# Patient Record
Sex: FEMALE | Race: WHITE | NOT HISPANIC OR LATINO | URBAN - METROPOLITAN AREA
[De-identification: names, ages, dates, MRNs, and addresses within clinical notes are randomized per-mention and may not be internally consistent; named-entity substitution may affect disease eponyms.]

---

## 2017-05-29 ENCOUNTER — OFFICE VISIT (OUTPATIENT)
Dept: UROLOGY | Facility: CLINIC | Age: 57
End: 2017-05-29
Payer: COMMERCIAL

## 2017-05-29 VITALS
BODY MASS INDEX: 27.48 KG/M2 | DIASTOLIC BLOOD PRESSURE: 78 MMHG | HEART RATE: 82 BPM | HEIGHT: 64 IN | SYSTOLIC BLOOD PRESSURE: 130 MMHG | WEIGHT: 160.94 LBS

## 2017-05-29 DIAGNOSIS — N81.6 RECTOCELE: ICD-10-CM

## 2017-05-29 DIAGNOSIS — N20.0 KIDNEY STONE: Primary | ICD-10-CM

## 2017-05-29 LAB
BILIRUB SERPL-MCNC: ABNORMAL MG/DL
BLOOD URINE, POC: ABNORMAL
COLOR, POC UA: YELLOW
GLUCOSE UR QL STRIP: ABNORMAL
KETONES UR QL STRIP: ABNORMAL
LEUKOCYTE ESTERASE URINE, POC: ABNORMAL
NITRITE, POC UA: ABNORMAL
PH, POC UA: 5
PROTEIN, POC: ABNORMAL
SPECIFIC GRAVITY, POC UA: 1.03
UROBILINOGEN, POC UA: ABNORMAL

## 2017-05-29 PROCEDURE — 99203 OFFICE O/P NEW LOW 30 MIN: CPT | Mod: 25,S$GLB,, | Performed by: UROLOGY

## 2017-05-29 PROCEDURE — 81002 URINALYSIS NONAUTO W/O SCOPE: CPT | Mod: S$GLB,,, | Performed by: UROLOGY

## 2017-05-29 PROCEDURE — 99999 PR PBB SHADOW E&M-EST. PATIENT-LVL III: CPT | Mod: PBBFAC,,, | Performed by: UROLOGY

## 2017-05-29 NOTE — PROGRESS NOTES
Subjective:       Patient ID: Sagrario Carballo is a 57 y.o. female.    Chief Complaint: Nephrolithiasis    HPI     57 year old with a history of left flank pain.  She has a history of an obstructing left ureteral stone and underwent ureteroscopy last year by Dr. Buck.  She complains of return of left flank pain for the last few weeks.  She says the pain is constant and is worse when she is sitting for a long time at her computer working.  A repeat CT scan is done and I also reviewed her scan from last year. There is a small 2-3 mm non-obstructing stone in the left lower pole which was present last year.  The larger stone in the renal pelvis is gone.  She also notices a bulging sensation in her vagina.  She was examined by Dr. Buck and was told she has a rectocele.   She is here for another opinion.  Urine dipstick shows negative for all components.    Past Medical History:   Diagnosis Date    Kidney stone      Past Surgical History:   Procedure Laterality Date    HYSTERECTOMY         Current Outpatient Prescriptions:     cyanocobalamin (VITAMIN B-12) 1000 MCG tablet, Inject 100 mcg as directed every 30 days., Disp: , Rfl:      Review of Systems   Constitutional: Negative for chills and fever.   Eyes: Negative for visual disturbance.   Respiratory: Negative for shortness of breath.    Cardiovascular: Negative for chest pain.   Gastrointestinal: Negative for abdominal pain and nausea.   Genitourinary: Positive for flank pain. Negative for dysuria and hematuria.   Musculoskeletal: Negative for gait problem.   Skin: Negative for rash.   Neurological: Negative for seizures.   Psychiatric/Behavioral: Negative for confusion.       Objective:      Physical Exam   Constitutional: She is oriented to person, place, and time. She appears well-developed and well-nourished.   HENT:   Head: Normocephalic.   Eyes: Conjunctivae and EOM are normal.   Neck: Normal range of motion.   Cardiovascular: Normal rate.     Pulmonary/Chest: Effort normal.   Abdominal: Soft. She exhibits no distension and no mass. There is no tenderness.   Genitourinary:   Genitourinary Comments: Bladder non-tender and nondistended  No CVA tenderness   Musculoskeletal: She exhibits no edema.   Neurological: She is alert and oriented to person, place, and time.   Skin: Skin is warm and dry. No rash noted. No erythema.   Psychiatric: She has a normal mood and affect. Her behavior is normal.   Vitals reviewed.      Assessment:       1. Kidney stone    2. Rectocele        Plan:       Kidney stone  -     POCT URINE DIPSTICK WITHOUT MICROSCOPE    Rectocele      I do not recommend treated of her small kidney stone.  We discussed treatment of the rectocele which is primarily surgical.